# Patient Record
Sex: MALE | Race: OTHER | HISPANIC OR LATINO | URBAN - METROPOLITAN AREA
[De-identification: names, ages, dates, MRNs, and addresses within clinical notes are randomized per-mention and may not be internally consistent; named-entity substitution may affect disease eponyms.]

---

## 2019-01-16 ENCOUNTER — EMERGENCY (EMERGENCY)
Facility: HOSPITAL | Age: 29
LOS: 1 days | Discharge: HOME | End: 2019-01-16
Attending: EMERGENCY MEDICINE
Payer: MEDICAID

## 2019-01-16 VITALS
DIASTOLIC BLOOD PRESSURE: 78 MMHG | TEMPERATURE: 99 F | SYSTOLIC BLOOD PRESSURE: 121 MMHG | HEART RATE: 64 BPM | OXYGEN SATURATION: 99 % | HEIGHT: 67 IN | WEIGHT: 139.99 LBS | RESPIRATION RATE: 17 BRPM

## 2019-01-16 VITALS
DIASTOLIC BLOOD PRESSURE: 79 MMHG | HEART RATE: 70 BPM | SYSTOLIC BLOOD PRESSURE: 132 MMHG | TEMPERATURE: 98 F | RESPIRATION RATE: 18 BRPM | OXYGEN SATURATION: 98 %

## 2019-01-16 PROCEDURE — 99283 EMERGENCY DEPT VISIT LOW MDM: CPT

## 2019-01-16 RX ORDER — ACETAMINOPHEN 500 MG
975 TABLET ORAL ONCE
Qty: 0 | Refills: 0 | Status: COMPLETED | OUTPATIENT
Start: 2019-01-16 | End: 2019-01-16

## 2019-01-16 RX ORDER — IBUPROFEN 200 MG
600 TABLET ORAL ONCE
Qty: 0 | Refills: 0 | Status: COMPLETED | OUTPATIENT
Start: 2019-01-16 | End: 2019-01-16

## 2019-01-16 RX ADMIN — Medication 975 MILLIGRAM(S): at 10:54

## 2019-01-16 RX ADMIN — Medication 600 MILLIGRAM(S): at 10:54

## 2019-01-16 NOTE — ED PROVIDER NOTE - NS ED ROS FT
Constitutional: No fever or chills  Eyes: No visual changes, eye pain or redness  HEENT: No throat pain, ear pain, nasal pain. No nose bleeding. +R upper tooth pain  CV: No chest pain or lower extremity edema  Resp: No SOB no cough  GI: No abd pain. No nausea or vomiting. No diarrhea. No constipation.   : No dysuria, hematuria.   MSK: No musculoskeletal pain  Skin: No rash  Neuro: No headache. No numbness or tingling. No weakness.

## 2019-01-16 NOTE — ED ADULT NURSE REASSESSMENT NOTE - NS ED NURSE REASSESS COMMENT FT1
Pt given tylenol and motrin for pain. Ice pack provided. Discharge home to immediately follow up at dental clinic. VSS

## 2019-01-16 NOTE — ED PROVIDER NOTE - ATTENDING CONTRIBUTION TO CARE
Pt with R upper molar area tooth ache radiating toward temporal region.  no fever, vomiting.  no LNA, no overlying cellulitis.  Called dentistry, can be seen in clinic today, referred pt to outpt.

## 2019-01-16 NOTE — ED PROVIDER NOTE - PHYSICAL EXAMINATION
GEN: Well Appearing, Nontoxic, NAD  HEENT: NC/AT, Symm Facies. PERRL, EOMI, MMM, posterior pharynx clear. Concern for potential crack in R upper most posterior molar with mild tenderness to gingiva in this area.   CV: No JVD/Bruits or stridor;  +S1S2, RRR w/o m/g/r  RESP: CTAB w/o w/r/r  ABD: Soft, nt/nd, +BS. No guarding/rebound. No RUQ tender, no CVAT  EXT/MSK: No lower extremity edema or calf tenderness. WWP, palpable pulses. FROMx4  SKIN: No erythema, lesions or rash  Neuro: Grossly intact, AOX3 with normal speech, CN II-XII intact; Sensation intact, motor 5/5 throughout. Gait normal

## 2019-01-16 NOTE — ED PROVIDER NOTE - PROGRESS NOTE DETAILS
spoke with dental resident who states patient can go to the clinic now at [address] as an emergency visit and be seen today. -Patti Driscoll PA-C

## 2019-01-16 NOTE — ED PROVIDER NOTE - OBJECTIVE STATEMENT
27 y/o M no significant pmhx presenting with R upper tooth pain, acutely since last night. Patient states that he thinks he bit into a bone when eating a steak on a bone and then started having sharp pain in this tooth. He reports that he has been unable to eat hard foods since that time. He denies any fevers or chills. Denies any previous trauma to this tooth. Tried taking 400mg of motrin last night after pain started but had minimal relief.

## 2019-01-16 NOTE — ED PROVIDER NOTE - NSFOLLOWUPINSTRUCTIONS_ED_ALL_ED_FT
1. Go immediately to the dental clinic. Report that you were sent there as an emergency visit from the ER   2. Continue to take tylenol and motrin for your pain   3. Return to the ER for any new or worsening symptoms

## 2019-01-16 NOTE — ED ADULT NURSE NOTE - OBJECTIVE STATEMENT
28y male coming in from home c/o dental pain. A&Ox3 and VSS with girlfriend at bedside. Denies medical hx. Reports right upper dental pain since 1800 last night. Pt reports constant, sharp 10/10 pain now radiating to right head. Pt took advil last night at 2100. Denies relief. States unable to sleep last night. Denies recent dental work. Denies fever/chills.

## 2021-05-05 ENCOUNTER — APPOINTMENT (OUTPATIENT)
Dept: DISASTER EMERGENCY | Facility: OTHER | Age: 31
End: 2021-05-05

## 2022-10-25 ENCOUNTER — APPOINTMENT (OUTPATIENT)
Dept: GASTROENTEROLOGY | Facility: CLINIC | Age: 32
End: 2022-10-25

## 2022-10-25 ENCOUNTER — NON-APPOINTMENT (OUTPATIENT)
Age: 32
End: 2022-10-25

## 2022-10-25 VITALS
SYSTOLIC BLOOD PRESSURE: 102 MMHG | WEIGHT: 140 LBS | BODY MASS INDEX: 21.97 KG/M2 | TEMPERATURE: 98.7 F | HEART RATE: 79 BPM | DIASTOLIC BLOOD PRESSURE: 62 MMHG | HEIGHT: 67 IN | OXYGEN SATURATION: 98 %

## 2022-10-25 DIAGNOSIS — R19.7 DIARRHEA, UNSPECIFIED: ICD-10-CM

## 2022-10-25 PROBLEM — Z00.00 ENCOUNTER FOR PREVENTIVE HEALTH EXAMINATION: Status: ACTIVE | Noted: 2022-10-25

## 2022-10-25 PROCEDURE — 99204 OFFICE O/P NEW MOD 45 MIN: CPT

## 2022-10-25 NOTE — ASSESSMENT
[FreeTextEntry1] : JOSE DARNELL 32 year M with acute onset of nausea, vomiting and diarrhea for 10 days.\par \par 1. acute diarrhea\par - I will check stools studies for GI PCR, C diff and calprotectin\par - Based on results will schedule follow up.\par \par

## 2022-10-25 NOTE — PHYSICAL EXAM
[Alert] : alert [Normal Voice/Communication] : normal voice/communication [Healthy Appearing] : healthy appearing [No Acute Distress] : no acute distress [Sclera] : the sclera and conjunctiva were normal [Normal Lips/Gums] : the lips and gums were normal [Hearing Threshold Finger Rub Not [place]] : hearing was normal [Oropharynx] : the oropharynx was normal [Normal Appearance] : the appearance of the neck was normal [No Neck Mass] : no neck mass was observed [No Respiratory Distress] : no respiratory distress [No Acc Muscle Use] : no accessory muscle use [Respiration, Rhythm And Depth] : normal respiratory rhythm and effort [Auscultation Breath Sounds / Voice Sounds] : lungs were clear to auscultation bilaterally [Heart Rate And Rhythm] : heart rate was normal and rhythm regular [Normal S1, S2] : normal S1 and S2 [Murmurs] : no murmurs [Bowel Sounds] : normal bowel sounds [Abdomen Tenderness] : non-tender [No Masses] : no abdominal mass palpated [Abdomen Soft] : soft [] : no hepatosplenomegaly [Normal] : oriented to person, place, and time [Oriented To Time, Place, And Person] : oriented to person, place, and time

## 2022-10-25 NOTE — ADDENDUM
[FreeTextEntry1] : The risks and benefits of my recommendations, as well as other treatment options were discussed with the patient today. Questions were answered.\par \par Please feel free to contact for any questions or concerns at my office  in the telephone numbers listed below.\par \par [address], [address], [place], [place], [address] Telephone: [phone] Fax: [phone]\par \par \par

## 2022-10-25 NOTE — CONSULT LETTER
[Dear  ___] : Dear  [unfilled], [Consult Letter:] : I had the pleasure of evaluating your patient, [unfilled]. [Please see my note below.] : Please see my note below. [Consult Closing:] : Thank you very much for allowing me to participate in the care of this patient.  If you have any questions, please do not hesitate to contact me. [Sincerely,] : Sincerely, [FreeTextEntry3] : Liliana Yoo MD\par \par Assistant Clinical Professor \par Division of Gastroenterology at [facility]\par [facility]\par [facility]\par [facility]\par \par [address], [place] [address]\par Tel: [phone] | Fax: [phone]\par \par Twitter (Personal): @[email or web address] \par \par \par

## 2022-10-25 NOTE — HISTORY OF PRESENT ILLNESS
[FreeTextEntry1] : JOSE DARNELL 32 year M with acute onset of nausea, vomiting and diarrhea for 10 days.\par \par Patient states of a good appetite, no loss of weight, bowel movement 5 x daily, watery and brown stools without blood or mucus. Denies abdominal pain, nausea, vomiting, melena, hematochezia, or hematemesis. He wakes up twice at night for BM.\par \par FH: No 1st degree or 2nd degree relative with colon cancer, gastric cancer or pancreatic cancer.\par \par

## 2022-11-10 ENCOUNTER — NON-APPOINTMENT (OUTPATIENT)
Age: 32
End: 2022-11-10

## 2022-11-10 LAB
C DIFF TOXIN B QL PCR REFLEX: NORMAL
CALPROTECTIN FECAL: <16 UG/G
GDH ANTIGEN: NOT DETECTED
GI PCR PANEL: DETECTED
SAPOVIRUS (GENOGROUPS I, II, IV, AND V): DETECTED
TOXIN A AND B: NOT DETECTED

## 2022-12-22 ENCOUNTER — APPOINTMENT (OUTPATIENT)
Dept: GASTROENTEROLOGY | Facility: CLINIC | Age: 32
End: 2022-12-22

## 2022-12-26 ENCOUNTER — EMERGENCY (EMERGENCY)
Facility: HOSPITAL | Age: 32
LOS: 1 days | Discharge: HOME | End: 2022-12-26
Attending: STUDENT IN AN ORGANIZED HEALTH CARE EDUCATION/TRAINING PROGRAM
Payer: COMMERCIAL

## 2022-12-26 VITALS
RESPIRATION RATE: 15 BRPM | OXYGEN SATURATION: 98 % | HEART RATE: 86 BPM | DIASTOLIC BLOOD PRESSURE: 76 MMHG | TEMPERATURE: 98 F | SYSTOLIC BLOOD PRESSURE: 116 MMHG

## 2022-12-26 VITALS
DIASTOLIC BLOOD PRESSURE: 110 MMHG | TEMPERATURE: 98 F | HEART RATE: 128 BPM | WEIGHT: 160.06 LBS | RESPIRATION RATE: 28 BRPM | HEIGHT: 70 IN | OXYGEN SATURATION: 100 % | SYSTOLIC BLOOD PRESSURE: 160 MMHG

## 2022-12-26 LAB
ALBUMIN SERPL ELPH-MCNC: 5.2 G/DL — HIGH (ref 3.3–5)
ALP SERPL-CCNC: 74 U/L — SIGNIFICANT CHANGE UP (ref 40–120)
ALT FLD-CCNC: 27 U/L — SIGNIFICANT CHANGE UP (ref 10–45)
AMPHET UR-MCNC: POSITIVE
ANION GAP SERPL CALC-SCNC: 21 MMOL/L — HIGH (ref 5–17)
APAP SERPL-MCNC: <15 UG/ML — SIGNIFICANT CHANGE UP (ref 10–30)
AST SERPL-CCNC: 26 U/L — SIGNIFICANT CHANGE UP (ref 10–40)
BARBITURATES UR SCN-MCNC: NEGATIVE — SIGNIFICANT CHANGE UP
BASOPHILS # BLD AUTO: 0.04 K/UL — SIGNIFICANT CHANGE UP (ref 0–0.2)
BASOPHILS NFR BLD AUTO: 0.3 % — SIGNIFICANT CHANGE UP (ref 0–2)
BENZODIAZ UR-MCNC: NEGATIVE — SIGNIFICANT CHANGE UP
BILIRUB SERPL-MCNC: 0.6 MG/DL — SIGNIFICANT CHANGE UP (ref 0.2–1.2)
BUN SERPL-MCNC: 13 MG/DL — SIGNIFICANT CHANGE UP (ref 7–23)
CALCIUM SERPL-MCNC: 10.1 MG/DL — SIGNIFICANT CHANGE UP (ref 8.4–10.5)
CHLORIDE SERPL-SCNC: 96 MMOL/L — SIGNIFICANT CHANGE UP (ref 96–108)
CO2 SERPL-SCNC: 17 MMOL/L — LOW (ref 22–31)
COCAINE METAB.OTHER UR-MCNC: NEGATIVE — SIGNIFICANT CHANGE UP
CREAT SERPL-MCNC: 0.72 MG/DL — SIGNIFICANT CHANGE UP (ref 0.5–1.3)
EGFR: 124 ML/MIN/1.73M2 — SIGNIFICANT CHANGE UP
EOSINOPHIL # BLD AUTO: 0.03 K/UL — SIGNIFICANT CHANGE UP (ref 0–0.5)
EOSINOPHIL NFR BLD AUTO: 0.2 % — SIGNIFICANT CHANGE UP (ref 0–6)
ETHANOL SERPL-MCNC: 31 MG/DL — HIGH (ref 0–10)
FLUAV AG NPH QL: SIGNIFICANT CHANGE UP
FLUBV AG NPH QL: SIGNIFICANT CHANGE UP
GLUCOSE SERPL-MCNC: 128 MG/DL — HIGH (ref 70–99)
HCT VFR BLD CALC: 42.6 % — SIGNIFICANT CHANGE UP (ref 39–50)
HGB BLD-MCNC: 14.5 G/DL — SIGNIFICANT CHANGE UP (ref 13–17)
IMM GRANULOCYTES NFR BLD AUTO: 0.7 % — SIGNIFICANT CHANGE UP (ref 0–0.9)
LYMPHOCYTES # BLD AUTO: 18 % — SIGNIFICANT CHANGE UP (ref 13–44)
LYMPHOCYTES # BLD AUTO: 2.19 K/UL — SIGNIFICANT CHANGE UP (ref 1–3.3)
MAGNESIUM SERPL-MCNC: 1.7 MG/DL — SIGNIFICANT CHANGE UP (ref 1.6–2.6)
MCHC RBC-ENTMCNC: 31.2 PG — SIGNIFICANT CHANGE UP (ref 27–34)
MCHC RBC-ENTMCNC: 34 GM/DL — SIGNIFICANT CHANGE UP (ref 32–36)
MCV RBC AUTO: 91.6 FL — SIGNIFICANT CHANGE UP (ref 80–100)
METHADONE UR-MCNC: NEGATIVE — SIGNIFICANT CHANGE UP
MONOCYTES # BLD AUTO: 0.71 K/UL — SIGNIFICANT CHANGE UP (ref 0–0.9)
MONOCYTES NFR BLD AUTO: 5.8 % — SIGNIFICANT CHANGE UP (ref 2–14)
NEUTROPHILS # BLD AUTO: 9.15 K/UL — HIGH (ref 1.8–7.4)
NEUTROPHILS NFR BLD AUTO: 75 % — SIGNIFICANT CHANGE UP (ref 43–77)
NRBC # BLD: 0 /100 WBCS — SIGNIFICANT CHANGE UP (ref 0–0)
NT-PROBNP SERPL-SCNC: <7 PG/ML — SIGNIFICANT CHANGE UP (ref 0–300)
OPIATES UR-MCNC: NEGATIVE — SIGNIFICANT CHANGE UP
OXYCODONE UR-MCNC: NEGATIVE — SIGNIFICANT CHANGE UP
PCP SPEC-MCNC: SIGNIFICANT CHANGE UP
PCP UR-MCNC: NEGATIVE — SIGNIFICANT CHANGE UP
PLATELET # BLD AUTO: 280 K/UL — SIGNIFICANT CHANGE UP (ref 150–400)
POTASSIUM SERPL-MCNC: 3.3 MMOL/L — LOW (ref 3.5–5.3)
POTASSIUM SERPL-SCNC: 3.3 MMOL/L — LOW (ref 3.5–5.3)
PROT SERPL-MCNC: 7.6 G/DL — SIGNIFICANT CHANGE UP (ref 6–8.3)
RBC # BLD: 4.65 M/UL — SIGNIFICANT CHANGE UP (ref 4.2–5.8)
RBC # FLD: 12.4 % — SIGNIFICANT CHANGE UP (ref 10.3–14.5)
RSV RNA NPH QL NAA+NON-PROBE: SIGNIFICANT CHANGE UP
SALICYLATES SERPL-MCNC: <2 MG/DL — LOW (ref 15–30)
SARS-COV-2 RNA SPEC QL NAA+PROBE: SIGNIFICANT CHANGE UP
SODIUM SERPL-SCNC: 134 MMOL/L — LOW (ref 135–145)
THC UR QL: POSITIVE
TROPONIN T, HIGH SENSITIVITY RESULT: 8 NG/L — SIGNIFICANT CHANGE UP (ref 0–51)
TROPONIN T, HIGH SENSITIVITY RESULT: <6 NG/L — SIGNIFICANT CHANGE UP (ref 0–51)
WBC # BLD: 12.2 K/UL — HIGH (ref 3.8–10.5)
WBC # FLD AUTO: 12.2 K/UL — HIGH (ref 3.8–10.5)

## 2022-12-26 PROCEDURE — 96376 TX/PRO/DX INJ SAME DRUG ADON: CPT

## 2022-12-26 PROCEDURE — 99285 EMERGENCY DEPT VISIT HI MDM: CPT | Mod: 25

## 2022-12-26 PROCEDURE — 93005 ELECTROCARDIOGRAM TRACING: CPT

## 2022-12-26 PROCEDURE — 36415 COLL VENOUS BLD VENIPUNCTURE: CPT

## 2022-12-26 PROCEDURE — 83735 ASSAY OF MAGNESIUM: CPT

## 2022-12-26 PROCEDURE — 71045 X-RAY EXAM CHEST 1 VIEW: CPT

## 2022-12-26 PROCEDURE — 96374 THER/PROPH/DIAG INJ IV PUSH: CPT

## 2022-12-26 PROCEDURE — 82550 ASSAY OF CK (CPK): CPT

## 2022-12-26 PROCEDURE — 93010 ELECTROCARDIOGRAM REPORT: CPT

## 2022-12-26 PROCEDURE — 80307 DRUG TEST PRSMV CHEM ANLYZR: CPT

## 2022-12-26 PROCEDURE — 87637 SARSCOV2&INF A&B&RSV AMP PRB: CPT

## 2022-12-26 PROCEDURE — 71045 X-RAY EXAM CHEST 1 VIEW: CPT | Mod: 26

## 2022-12-26 PROCEDURE — 80053 COMPREHEN METABOLIC PANEL: CPT

## 2022-12-26 PROCEDURE — 85025 COMPLETE CBC W/AUTO DIFF WBC: CPT

## 2022-12-26 PROCEDURE — 84484 ASSAY OF TROPONIN QUANT: CPT

## 2022-12-26 PROCEDURE — 99291 CRITICAL CARE FIRST HOUR: CPT

## 2022-12-26 PROCEDURE — 83880 ASSAY OF NATRIURETIC PEPTIDE: CPT

## 2022-12-26 RX ORDER — SODIUM CHLORIDE 9 MG/ML
1000 INJECTION INTRAMUSCULAR; INTRAVENOUS; SUBCUTANEOUS ONCE
Refills: 0 | Status: COMPLETED | OUTPATIENT
Start: 2022-12-26 | End: 2022-12-26

## 2022-12-26 RX ORDER — POTASSIUM CHLORIDE 20 MEQ
40 PACKET (EA) ORAL ONCE
Refills: 0 | Status: COMPLETED | OUTPATIENT
Start: 2022-12-26 | End: 2022-12-26

## 2022-12-26 RX ADMIN — SODIUM CHLORIDE 1000 MILLILITER(S): 9 INJECTION INTRAMUSCULAR; INTRAVENOUS; SUBCUTANEOUS at 09:27

## 2022-12-26 RX ADMIN — Medication 2 MILLIGRAM(S): at 09:28

## 2022-12-26 RX ADMIN — SODIUM CHLORIDE 1000 MILLILITER(S): 9 INJECTION INTRAMUSCULAR; INTRAVENOUS; SUBCUTANEOUS at 07:32

## 2022-12-26 RX ADMIN — Medication 2 MILLIGRAM(S): at 07:29

## 2022-12-26 RX ADMIN — SODIUM CHLORIDE 1000 MILLILITER(S): 9 INJECTION INTRAMUSCULAR; INTRAVENOUS; SUBCUTANEOUS at 13:15

## 2022-12-26 RX ADMIN — Medication 40 MILLIEQUIVALENT(S): at 09:27

## 2022-12-26 RX ADMIN — Medication 2 MILLIGRAM(S): at 13:14

## 2022-12-26 NOTE — ED ADULT NURSE NOTE - OBJECTIVE STATEMENT
32Y male, A&Ox4, pt reports no significant PMH. pt presents to the ED c/o anxiety and moderate CP after taking ectasy and using ETOH 2 hours prior to arrival. pt states he has been under a lot of stress and took ectasy with a friend. pt stated he started to feel like he was having a panic attack (pt states he has had one before) and called EMS for evaluation. pt denies f/n/v/d, headache, vision changes, back pain, abd pain, numbness/tingling, chills. pt states he had SI x4 days ago after having a "stressful conversation" with His ex. pt followed up statement saying "I don't feel that way now, I would never hurt myself. I was just feeling really down in the moment". pt denies having A plan to carry out SI. 18g iv placed in right AC by RN. 32Y male, A&Ox4, pt reports no significant PMH. pt presents to the ED c/o anxiety and moderate CP after taking ectasy and using ETOH 2 hours prior to arrival. pt states he has been under a lot of stress and took ectasy with a friend. pt stated he started to feel like he was having a panic attack (pt states he has had one before) and called EMS for evaluation. pt endorses having 10 alcoholic beverages last night. pt denies f/n/v/d, headache, vision changes, back pain, abd pain, numbness/tingling, chills. pt states he had SI x4 days ago after having a "stressful conversation" with His ex. pt followed up statement saying "I don't feel that way now, I would never hurt myself. I was just feeling really down in the moment". pt denies having A plan to carry out SI. 18g iv placed in right AC by RN. 32Y male, A&Ox4, pt reports no significant PMH. pt presents to the ED c/o anxiety and moderate CP after taking ectasy and using ETOH 2 hours prior to arrival. pt states he has been under a lot of stress and took ectasy with a friend. pt stated he started to feel like he was having a panic attack (pt states he has had one before) and called EMS for evaluation. pt endorses having 10 alcoholic beverages last night. pt denies f/n/v/d, headache, vision changes, back pain, abd pain, numbness/tingling, chills. pt denies SI/HI during assessment. I was just feeling really down in the moment". pt denies having A plan to carry out SI. 18g iv placed in right AC by RN.

## 2022-12-26 NOTE — ED PROVIDER NOTE - PROGRESS NOTE DETAILS
O'Alissa DO PGY-3:  paged tox for c/s O'Alissa DO PGY-3: pt HR back to 140s. Endorsing anxiousness again. Ordered more meds. O'Alissa DO PGY-3: feel better but still mildly tachycardic and tachypneic. Ordered more meds and fluids. denies cp at this time Dane Mayo MD. pt signed out to me pending reassessment. pt feels much improved. tachycardia and tachypneic resolved. pt is A&Ox3. tolerated PO. ambulated to bathroom without difficulty. ED evaluation and management discussed with the patient. Close PMD follow up encouraged.  Strict ED return instructions discussed in detail and patient given the opportunity to ask any questions about their discharge diagnosis and instructions. Patient verbalized understanding. pt states he has a ride home.

## 2022-12-26 NOTE — ED PROVIDER NOTE - NS ED ROS FT
CONSTITUTIONAL - No fever, No diaphoresis, No weight change  SKIN - No rash  HEMATOLOGIC - No abnormal bleeding or bruising  EYES - No eye pain, No blurred vision  ENT - No change in hearing, No sore throat, No neck pain, No rhinorrhea, No ear pain  RESPIRATORY +shortness of breath, No cough  CARDIAC +chest pain, No palpitations  GI - No abdominal pain, No nausea, No vomiting, No diarrhea, No constipation  - No dysuria, no frequency, no hematuria.   MUSCULOSKELETAL - No joint pain, No swelling, No back pain  NEUROLOGIC - No numbness, No focal weakness, No headache, No dizziness

## 2022-12-26 NOTE — ED PROVIDER NOTE - CLINICAL SUMMARY MEDICAL DECISION MAKING FREE TEXT BOX
O'Alissa DO PGY-3: pt presents c/o cp and sob. States he used MDMA and etoh about 2 hrs prior to ED arrival. Will consult tox. Pt's skin is dry. Possible anti-cholineric compnent? Will discuss w/ tox. Ordered labs, meds, fluids, ativan. Will reassess. Dispo pending workup. O'Alissa DO PGY-3: pt presents c/o cp and sob. States he used MDMA and etoh about 2 hrs prior to ED arrival. Will consult tox. Pt's skin is dry. Possible anti-cholineric compnent? Will discuss w/ tox. Ordered labs, meds, fluids, ativan. No muscle rigidity or clonus to suggest serotonin syndrome., Dispo pending workup. OPortia DO PGY-3: pt presents c/o cp and sob. States he used MDMA and etoh about 2 hrs prior to ED arrival. Will consult tox. Pt's skin is dry. Possible anti-cholineric compnent? Concern for sympathomimetic toxicity. WIll obtain labs/ekg to r/o acs. Will discuss w/ tox. Ordered labs, meds, fluids, ativan. No muscle rigidity or clonus to suggest serotonin syndrome., Dispo pending workup. O'Alissa DO PGY-3: pt presents c/o cp and sob. States he used MDMA and etoh about 2 hrs prior to ED arrival. Will consult tox. Pt's skin is dry. Possible anti-cholineric compnent? Concern for sympathomimetic toxicity. WIll obtain labs/ekg to r/o acs. Will discuss w/ tox. Ordered labs, meds, fluids, ativan. No muscle rigidity or clonus to suggest serotonin syndrome., Dispo pending workup.    KERA Tamayo MD: Agree with resident/ACP MDM, assessment and plan as above. Pt is a 33 y/o male with anxiety/depression on lexapro who presented by EMS for palpitations and anxiety after taking MDMA. States he took it around midnight, in addition to 6-7 shots of liquor. Admits to drinking daily, however, denies prior h/o etoh withdrawal. Admits to cocaine use a few days ago, none currently. Denies SI/HI currently. Does admit to increased stress recently. Pt c/o dry mouth, also noted to have dry skin and mydriasis. Denies AH/VH. Ddx includes, however, is not limited to: anticholinergic toxidrome 2/2 MDMA/substance use, serotonin syndrome (less likely due to no rigidity), other tox process, other. Plan: IVF and ativan, basic labs, tox screen, CCM, observe until clinical sobriety and reassess overall clinical picture to determine final disposition

## 2022-12-26 NOTE — CONSULT NOTE ADULT - SUBJECTIVE AND OBJECTIVE BOX
MEDICAL TOXICOLOGY CONSULT    HPI:  32M no significant PMH, presents to the ED with chest discomfort/anxiety that began approx 2 hrs pta to ED. Pt states that he took MDMA and had alcohol, MDMA was taken approx 30 min before sxs began. Does report cocaine approx 24 hrs ago. Otherwise denies any other co-ingestants.  Vitals: HR 140s, /80, RR 18, T 98.3F, sats well on RA  EKG: QRS 80, QTc 441, no contiguous pathologic ST/TW changes  Exam: AAOx3 though slightly anxious, dilated pupils bilaterally and reactive, slightly dry mucous membranes, no diaphoresis/flushed skin/tremors/clonus  In ED: pt given lorazepam with improvement of HR down to 110s and improvement of sxs    Toxicology consulted for MDMA ingestion    PAST MEDICAL & SURGICAL HISTORY:  No pertinent past medical history          MEDICATION HISTORY:      FAMILY HISTORY:      REVIEW OF SYSTEMS:   _____unable to perform due to intoxication, dementia, or illness      Vital Signs Last 24 Hrs  T(C): 36.8 (26 Dec 2022 07:12), Max: 36.8 (26 Dec 2022 07:12)  T(F): 98.3 (26 Dec 2022 07:12), Max: 98.3 (26 Dec 2022 07:12)  HR: 125 (26 Dec 2022 07:30) (125 - 137)  BP: 154/80 (26 Dec 2022 07:30) (154/80 - 163/107)  BP(mean): --  RR: 18 (26 Dec 2022 07:30) (18 - 28)  SpO2: 99% (26 Dec 2022 07:30) (99% - 100%)    Parameters below as of 26 Dec 2022 07:30  Patient On (Oxygen Delivery Method): room air        SIGNIFICANT LABORATORY STUDIES:                        14.5   12.20 )-----------( 280      ( 26 Dec 2022 07:30 )             42.6                       Anion Gap: -- 12-26 @ 07:30  CK: -- 12-26 @ 07:30  Troponin:  --  12-26 @ 07:30  Pro-BNP:  --  12-26 @ 07:30  VBG:  --  12-26 @ 07:30  Carboxyhemoglobin %:  --  12-26 @ 07:30  Methemoglobin %:  --  12-26 @ 07:30  Osmolality Serum:  --  12-26 @ 07:30  Aspirin Level: <2.0<L>  12-26 @ 07:30  Acetaminophen Level:  <15  12-26 @ 07:30  Ethanol Level:  --  12-26 @ 07:30  Digoxin Level:  --  12-26 @ 07:30  Phenytoin Level:  --  12-26 @ 07:30  Carbamazepine level:  --  12-26 @ 07:30  Lamotrigine level:  --  12-26 @ 07:30

## 2022-12-26 NOTE — ED PROVIDER NOTE - OBJECTIVE STATEMENT
Dave ZAVALA PGY-3: 32-year-old male who denies chronic past medical history presents complaining of chest discomfort.  Patient states he took MDMA and had about 6 shots of alcohol over the past 12 hours.  Patient denies history of alcohol withdrawal.  Patient endorses feeling anxious along with shortness of breath.  Patient denies recent fevers chills, nausea vomiting..  Patient states nothing like this is ever happened in the past.  Patient endorses cocaine use about 24 hours prior. Dave ZAVALA PGY-3: 32-year-old male who denies chronic past medical history presents complaining of chest discomfort.  Patient states he took MDMA and had about 6 shots of alcohol over the past 12 hours.  Patient denies history of alcohol withdrawal.  Patient endorses feeling anxious along with shortness of breath.  Patient denies recent fevers chills, nausea vomiting..  Patient states nothing like this is ever happened in the past.  Patient endorses cocaine use about 24 hours prior. Denies current SI or HI during ED eval. Dave DO PGY-3: 32-year-old male w/ hx of depression, anxiety (on lexparo and zoloft) presents complaining of chest discomfort.  Patient states he took MDMA and had about 6 shots of alcohol over the past 12 hours.  Patient denies history of alcohol withdrawal.  Patient endorses feeling anxious along with shortness of breath.  Patient denies recent fevers chills, nausea vomiting..  Patient states nothing like this is ever happened in the past.  Patient endorses cocaine use about 24 hours prior. Denies current SI or HI during ED eval. Dave DO PGY-3: 32-year-old male w/ hx of depression, anxiety (on lexparo and zoloft) presents complaining of chest discomfort.  Patient states he took MDMA and had about 6 shots of alcohol over the past 12 hours.  Patient denies history of alcohol withdrawal.  Patient endorses feeling anxious along with shortness of breath.  Patient denies recent fevers chills, nausea vomiting..  Patient states nothing like this is ever happened in the past.  Patient endorses cocaine use about 24 hours prior. Denies current SI or HI during ED eval. Denies hx of IVDA

## 2022-12-26 NOTE — ED PROVIDER NOTE - PATIENT PORTAL LINK FT
You can access the FollowMyHealth Patient Portal offered by [facility] by registering at the following website: [email or web address]. By joining [facility]’s FollowMyHealth portal, you will also be able to view your health information using other applications (apps) compatible with our system.

## 2022-12-26 NOTE — CONSULT NOTE ADULT - ASSESSMENT
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 Occasionally MDMA can cause serotonin syndrome in pts typically on other serotonergic agents, doesn’t appear that pt is having serotonin syndrome at presentation.     Recommendations:  1. Follow up labwork/co-ingestants/troponin  2. Give pt IV benzos until sxs improve, monitor pt until sxs improve (duration of effects of MDMA typically not much longer than 6 hrs).  3. Can check rectal temperature to ensure pt not hyperthermic, if pt hyperthermic there may be concern for rhabdomyolysis and a CK   4. Rest of care per ED team

## 2022-12-26 NOTE — ED PROVIDER NOTE - PHYSICAL EXAMINATION
CONSTITUTIONAL: Well-developed; well-nourished. +appears uncomfortable   SKIN: +dry  HEAD: Normocephalic; atraumatic.  EYES: no conjunctival injection. PERRL.   ENT: No nasal discharge; airway clear.  NECK: Supple; non tender.  CARD: S1, S2 normal; no murmurs, gallops, or rubs. Regular rate and rhythm.   RESP: No wheezes, rales or rhonchi. +tachycardic   ABD: soft ntnd, no guarding, no distention, no rigidity.   EXT: Ambulates independently.  No cyanosis or edema.   NEURO: Alert, oriented, grossly unremarkable  PSYCH: Cooperative, appropriate. CONSTITUTIONAL: Well-developed; well-nourished. +appears uncomfortable   SKIN: +dry  HEAD: Normocephalic; atraumatic.  EYES: no conjunctival injection. EOMI. +dilated pupils   ENT: No nasal discharge; airway clear.  NECK: Supple; non tender.  CARD: S1, S2 normal; no murmurs, gallops, or rubs. +tachycardic   RESP: No wheezes, rales or rhonchi.   ABD: soft ntnd, no guarding, no distention, no rigidity.   EXT: Ambulates independently.  No cyanosis or edema.   NEURO: AOx3  PSYCH: +anxious CONSTITUTIONAL: Well-developed; well-nourished. +appears uncomfortable   SKIN: +dry  HEAD: Normocephalic; atraumatic.  EYES: no conjunctival injection. EOMI. +dilated pupils   ENT: No nasal discharge; airway clear.  NECK: Supple; non tender.  CARD: S1, S2 normal; no murmurs, gallops, or rubs. +tachycardic   RESP: No wheezes, rales or rhonchi.   ABD: soft ntnd, no guarding, no distention, no rigidity.   EXT: Ambulates independently.  No cyanosis or edema. No clonus or muscle rigidity   NEURO: AOx3  PSYCH: +anxious

## 2022-12-26 NOTE — ED PROVIDER NOTE - NSFOLLOWUPINSTRUCTIONS_ED_ALL_ED_FT
You may take Acetaminophen over the counter as needed for pain and/or fever. Use as directed and see medication warnings.  You may take Ibuprofen over the counter as needed for pain and/or fever. Use as directed and see medication warnings.  Please follow up with your primary care physician.  Please bring a copy of your results with you.  Please return to the emergency department for worsening of your symptoms.

## 2022-12-27 NOTE — ED POST DISCHARGE NOTE - RESULT SUMMARY
Patient called after discharge from hospital, wanted to know what UTox showed and what CXR showed. Confirmed name and  as identifiers, Informed him of results as stated. - Alexei Degroot PA-C

## 2023-12-04 ENCOUNTER — NON-APPOINTMENT (OUTPATIENT)
Age: 33
End: 2023-12-04

## 2024-09-24 NOTE — ED ADULT NURSE NOTE - NSSEPSISCRITERIAMET_ED_A_ED
COVER MY MEDS # [id number]   Sent to [facility] 09/24/2024.    Status: Approved, Coverage Starts on: 9/24/2024 12:00 AM, Coverage Ends on: 9/24/2025 12:00 AM   
Abnormal VS & WBC

## 2024-11-06 NOTE — ED ADULT NURSE NOTE - NSHISCREENINGQ1_ED_A_ED
Lab Results   Component Value Date    HGBA1C 6.0 (H) 08/24/2023     HgA1c on 02/07-6.2  Well controlled based on co-morbidities   Continue to monitor HgA1c Q6 months  Continue insulin glargine 22 units SQ daily  Avoid hypoglycemia   No

## 2025-05-03 ENCOUNTER — EMERGENCY (EMERGENCY)
Facility: HOSPITAL | Age: 35
LOS: 1 days | End: 2025-05-03
Attending: EMERGENCY MEDICINE
Payer: COMMERCIAL

## 2025-05-03 VITALS
SYSTOLIC BLOOD PRESSURE: 111 MMHG | WEIGHT: 139.99 LBS | HEIGHT: 67 IN | OXYGEN SATURATION: 97 % | RESPIRATION RATE: 20 BRPM | HEART RATE: 140 BPM | DIASTOLIC BLOOD PRESSURE: 82 MMHG | TEMPERATURE: 99 F

## 2025-05-03 VITALS
TEMPERATURE: 99 F | RESPIRATION RATE: 18 BRPM | SYSTOLIC BLOOD PRESSURE: 124 MMHG | HEART RATE: 83 BPM | DIASTOLIC BLOOD PRESSURE: 65 MMHG | OXYGEN SATURATION: 96 %

## 2025-05-03 PROCEDURE — 99285 EMERGENCY DEPT VISIT HI MDM: CPT | Mod: 25

## 2025-05-03 PROCEDURE — 93005 ELECTROCARDIOGRAM TRACING: CPT

## 2025-05-03 PROCEDURE — 99284 EMERGENCY DEPT VISIT MOD MDM: CPT

## 2025-05-03 PROCEDURE — 93010 ELECTROCARDIOGRAM REPORT: CPT

## 2025-05-03 RX ORDER — ALPRAZOLAM 0.5 MG
0.25 TABLET, EXTENDED RELEASE 24 HR ORAL ONCE
Refills: 0 | Status: DISCONTINUED | OUTPATIENT
Start: 2025-05-03 | End: 2025-05-03

## 2025-05-03 RX ADMIN — Medication 0.25 MILLIGRAM(S): at 19:16
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
## 2025-05-03 NOTE — ED PROVIDER NOTE - PATIENT PORTAL LINK FT
You can access the FollowMyHealth Patient Portal offered by [facility] by registering at the following website: [email or web address]. By joining [facility]’s FollowMyHealth portal, you will also be able to view your health information using other applications (apps) compatible with our system.

## 2025-05-03 NOTE — ED PROVIDER NOTE - NSFOLLOWUPINSTRUCTIONS_ED_ALL_ED_FT
[id number]-Glance  *More detailed information regarding your visit and discharge can be found by reviewing this packet.*  -----------------------------    Your diagnosis this visit was: elevated heart rate, likely in setting of anxiety and agitation.     From this ED visit you were prescribed:    You may be contacted by our Emergency Department Referrals Coordinator to set up your follow-up appointment within 24-48 hours of your discharge, Monday through Friday. We recommend you follow up with:    Please return to the Emergency Department if you experience any of the following symptoms:  - Shortness of breath or trouble breathing  - Pressure, pain, or tightness in the chest  - Face drooping, arm weakness or speech difficulty,  - Persistent or severe vomiting  - Head injury or loss of consciousness  - Nonstop bleeding or an open wound    Diet changes/restrictions   [ ] No Diet Restriction   [ ] Diet Restriction as follows: [id number]-[id number]-r-Glance  *More detailed information regarding your visit and discharge can be found by reviewing this packet.*  -----------------------------    Your diagnosis this visit was: elevated heart rate, likely in setting of anxiety and agitation.     From this ED visit you were prescribed:    You may be contacted by our Emergency Department Referrals Coordinator to set up your follow-up appointment within 24-48 hours of your discharge, Monday through Friday. We recommend you follow up with:    Please return to the Emergency Department if you experience any of the following symptoms:  - Shortness of breath or trouble breathing  - Pressure, pain, or tightness in the chest  - Face drooping, arm weakness or speech difficulty,  - Persistent or severe vomiting  - Head injury or loss of consciousness  - Nonstop bleeding or an open wound    Diet changes/restrictions   [x] No Diet Restriction   [ ] Diet Restriction as follows: Drxsnbtff-pe-n-Glance  *More detailed information regarding your visit and discharge can be found by reviewing this packet.*  -----------------------------    Your diagnosis this visit was: elevated heart rate, likely in setting of anxiety and agitation.     From this ED visit you were prescribed:    You may be contacted by our Emergency Department Referrals Coordinator to set up your follow-up appointment within 24-48 hours of your discharge, Monday through Friday. We recommend you follow up with:    Please return to the Emergency Department if you experience any of the following symptoms:  - Shortness of breath or trouble breathing  - Pressure, pain, or tightness in the chest  - Face drooping, arm weakness or speech difficulty,  - Persistent or severe vomiting  - Head injury or loss of consciousness  - Nonstop bleeding or an open wound    Diet changes/restrictions   [x] No Diet Restriction   [ ] Diet Restriction as follows:    There is no medical contraindication to confinement at this time.  Patient is fit for confinement.